# Patient Record
Sex: FEMALE | Race: WHITE | NOT HISPANIC OR LATINO | URBAN - METROPOLITAN AREA
[De-identification: names, ages, dates, MRNs, and addresses within clinical notes are randomized per-mention and may not be internally consistent; named-entity substitution may affect disease eponyms.]

---

## 2019-02-27 ENCOUNTER — TELEPHONE (OUTPATIENT)
Dept: OBSTETRICS AND GYNECOLOGY | Facility: CLINIC | Age: 39
End: 2019-02-27

## 2019-02-27 NOTE — TELEPHONE ENCOUNTER
Called in patients BC Nor-es-fe, one pack with no refills until her annual visit 03/22/2019 with Dr. Holcomb.

## 2019-02-27 NOTE — TELEPHONE ENCOUNTER
Scheduled 3/22 for annual.  Pt of Dr Denise.    [facility]--fe 24 tab   [facility]

## 2019-04-01 ENCOUNTER — OFFICE VISIT (OUTPATIENT)
Dept: OBSTETRICS AND GYNECOLOGY | Facility: CLINIC | Age: 39
End: 2019-04-01
Payer: COMMERCIAL

## 2019-04-01 VITALS
BODY MASS INDEX: 29.19 KG/M2 | WEIGHT: 171 LBS | SYSTOLIC BLOOD PRESSURE: 110 MMHG | DIASTOLIC BLOOD PRESSURE: 78 MMHG | HEIGHT: 64 IN

## 2019-04-01 DIAGNOSIS — Z01.419 WOMEN'S ANNUAL ROUTINE GYNECOLOGICAL EXAMINATION: Primary | ICD-10-CM

## 2019-04-01 PROBLEM — R51.9 HEADACHE: Status: ACTIVE | Noted: 2017-05-15

## 2019-04-01 PROBLEM — J34.3 HYPERTROPHY OF NASAL TURBINATES: Status: ACTIVE | Noted: 2017-05-15

## 2019-04-01 PROBLEM — J34.2 DEVIATED NASAL SEPTUM: Status: ACTIVE | Noted: 2017-05-15

## 2019-04-01 PROBLEM — R09.81 NASAL CONGESTION: Status: ACTIVE | Noted: 2017-05-15

## 2019-04-01 PROCEDURE — 99395 PREV VISIT EST AGE 18-39: CPT | Performed by: OBSTETRICS & GYNECOLOGY

## 2019-04-01 RX ORDER — NORETHINDRONE ACETATE AND ETHINYL ESTRADIOL AND FERROUS FUMARATE 1MG-20(24)
1 KIT ORAL DAILY
Qty: 84 TABLET | Refills: 3 | Status: SHIPPED | OUTPATIENT
Start: 2019-04-01 | End: 2020-03-30 | Stop reason: SDUPTHER

## 2019-04-01 RX ORDER — NORETHINDRONE ACETATE AND ETHINYL ESTRADIOL AND FERROUS FUMARATE 1MG-20(24)
1 KIT ORAL
COMMUNITY
Start: 2019-03-12 | End: 2019-04-01 | Stop reason: SDUPTHER

## 2019-04-01 NOTE — PROGRESS NOTES
"2019  Miley Emanuel is a 38 y.o. female who presents for annual exam.   Periods are rare, lasting 5 days. Dysmenorrhea:none. Cyclic symptoms include none. No intermenstrual bleeding, spotting, or discharge.    The patient is not currently sexually active. GYN screening history: last pap: was normal. Domestic violence: no.     Current contraception: OCP (estrogen/progesterone)  History of abnormal Pap smear: no  Last pap: 2017, normal    Regular self breast exam: yes        Family history of breast cancer: no  Family history of uterine or ovarian cancer: no    She has had a colonoscopy.    History of abnormal lipids: no  She does wear sunscreen.  She does wear her seatbelt.  Gardasil: not applicable    Menstrual History:  OB History      Para Term  AB Living    0 0 0 0 0 0    SAB TAB Ectopic Multiple Live Births    0 0 0 0 0         Patient's last menstrual period was 2019.         Review of Systems and Physical Exam  The following have been reviewed and updated as appropriate in this visit:      /78   Ht 1.626 m (5' 4\")   Wt 77.6 kg (171 lb)   LMP 2019   Breastfeeding? No   BMI 29.35 kg/m²   HPI  Review of Systems  Physical Exam   Constitutional: She is oriented to person, place, and time. She appears well-developed and well-nourished.   Genitourinary: Vagina normal and uterus normal. Pelvic exam was performed with patient in lithotomy exam position.   No labial rash or lesion.   External female genitalia normal. No signs of erythema. There is no lesion or tenderness on the right external genitalia. There is no tenderness on the left external genitalia.   Urethral meatus normal.   Urethra normal.   Normal bladder.   Vagina normal. Vagina exhibits no lesion. No tenderness or bleeding in the vagina. No vaginal discharge found.   Cervix exam normal.Cervix does not exhibit motion tenderness, discharge, friability or polyp.   Uterus is normal. Uterus is mobile. Uterus is not " enlarged or tender. Uterine contour is regular.   Adnexa normal. Right adnexum does not display mass, does not display tenderness, does not display fullness and present. Left adnexum does not display mass, does not display tenderness, does not display fullness and present.   Genitourinary Comments: Menses present   HENT:   Head: Normocephalic and atraumatic.   Neck: Neck supple. No thyromegaly present.   Cardiovascular: Normal rate and regular rhythm.    Pulmonary/Chest: Effort normal and breath sounds normal. Right breast exhibits no mass. Left breast exhibits no mass. Breasts are symmetrical. There is no breast swelling.   Abdominal: Soft. Bowel sounds are normal. She exhibits no mass. There is no tenderness. There is no rebound, no guarding and no CVA tenderness. No hernia.   Musculoskeletal: Normal range of motion.   Lymphadenopathy:     She has no cervical adenopathy.   Neurological: She is alert and oriented to person, place, and time.   Skin: Skin is warm.   Psychiatric: She has a normal mood and affect. Her behavior is normal.       Diagnoses and all orders for this visit:    Women's annual routine gynecological examination    Other orders  -     norethindrone-e.estradiol-iron 1 mg-20 mcg(24) /75 mg (4) per tablet; Take 1 tablet by mouth daily.    .  Breast self exam technique reviewed and patient encouraged to perform self-exam monthly.  Discussed healthy lifestyle modifications..  Contraception: will continue current method  General Health Maintenance:pap next year  No Follow-up on file.  Estpehania Holcomb MD

## 2020-03-30 NOTE — TELEPHONE ENCOUNTER
Medicine Refill Request    Last Office Visit: Visit date not found  Next Office Visit: Visit date not found        Current Outpatient Medications:   •  norethindrone-e.estradiol-iron 1 mg-20 mcg(24) /75 mg (4) per tablet, Take 1 tablet by mouth daily., Disp: 84 tablet, Rfl: 3      BP Readings from Last 3 Encounters:   04/01/19 110/78       Recent Lab results:  No results found for: CHOL, No results found for: HDL, No results found for: LDLCALC, No results found for: TRIG     No results found for: GLUCOSE, No results found for: HGBA1C      No results found for: CREATININE    Lab Results   Component Value Date    TSH 1.94 09/11/2015      Pt calling to refill her BC and would like it sent to [facility]. Pt is due for annual in April and will schedule for after the corona outbreak.

## 2020-03-31 RX ORDER — NORETHINDRONE ACETATE AND ETHINYL ESTRADIOL AND FERROUS FUMARATE 1MG-20(24)
1 KIT ORAL DAILY
Qty: 84 TABLET | Refills: 0 | Status: SHIPPED | OUTPATIENT
Start: 2020-03-31 | End: 2020-07-20 | Stop reason: SDUPTHER

## 2020-07-20 ENCOUNTER — OFFICE VISIT (OUTPATIENT)
Dept: OBSTETRICS AND GYNECOLOGY | Facility: CLINIC | Age: 40
End: 2020-07-20
Payer: COMMERCIAL

## 2020-07-20 VITALS — SYSTOLIC BLOOD PRESSURE: 110 MMHG | BODY MASS INDEX: 33.3 KG/M2 | DIASTOLIC BLOOD PRESSURE: 82 MMHG | WEIGHT: 194 LBS

## 2020-07-20 DIAGNOSIS — Z01.419 WOMEN'S ANNUAL ROUTINE GYNECOLOGICAL EXAMINATION: Primary | ICD-10-CM

## 2020-07-20 PROCEDURE — 99395 PREV VISIT EST AGE 18-39: CPT | Performed by: OBSTETRICS & GYNECOLOGY

## 2020-07-20 RX ORDER — FLUTICASONE PROPIONATE 50 MCG
2 SPRAY, SUSPENSION (ML) NASAL DAILY
COMMUNITY
Start: 2020-01-22

## 2020-07-20 RX ORDER — NORETHINDRONE ACETATE AND ETHINYL ESTRADIOL AND FERROUS FUMARATE 1MG-20(24)
1 KIT ORAL DAILY
Qty: 84 TABLET | Refills: 3 | Status: SHIPPED | OUTPATIENT
Start: 2020-07-20 | End: 2021-07-09

## 2020-07-20 RX ORDER — RIZATRIPTAN BENZOATE 5 MG/1
5 TABLET ORAL
COMMUNITY
Start: 2020-01-22

## 2020-07-20 NOTE — PROGRESS NOTES
2020  Miley Emanuel is a 40 y.o. female who presents for annual exam.   Periods are rare, lasting 7 days. Dysmenorrhea:none. Cyclic symptoms include none. No intermenstrual bleeding, spotting, or discharge.    The patient is not currently sexually active. GYN screening history: last pap: was normal. Domestic violence: no.     Current contraception: OCP (estrogen/progesterone)  History of abnormal Pap smear: no  Last pap: 2017, normal    Regular self breast exam: yes        Family history of breast cancer: no  Family history of uterine or ovarian cancer: no    She has had a colonoscopy.    History of abnormal lipids: yes - has been trying lifestyle changes to reduce it  She does wear sunscreen.  She does wear her seatbelt.      Menstrual History:  OB History        0    Para   0    Term   0       0    AB   0    Living   0       SAB   0    TAB   0    Ectopic   0    Multiple   0    Live Births   0                Patient's last menstrual period was 2020.  Menopause Status: Pre-Menopause  Period Pattern: (!) Irregular      Review of Systems and Physical Exam  The following have been reviewed and updated as appropriate in this visit:      Visit Vitals  /82 (BP Location: Left upper arm, Patient Position: Sitting)   Wt 88 kg (194 lb)   LMP 2020 Comment: usually get periods q 3 mos, heavy and lasted long   BMI 33.30 kg/m²     HPI  Review of Systems  Physical Exam   Constitutional: She is oriented to person, place, and time. She appears well-developed and well-nourished.   Genitourinary: Vagina normal and uterus normal. Pelvic exam was performed with patient in lithotomy exam position.   No labial rash or lesion.   External female genitalia normal. No signs of erythema. There is no lesion or tenderness on the right external genitalia. There is no tenderness on the left external genitalia.   Urethral meatus normal.   Urethra normal.   Normal bladder.   Vagina normal. Vagina exhibits no  lesion. No tenderness or bleeding in the vagina. No vaginal discharge found.   Cervix exam normal.Cervix does not exhibit motion tenderness, discharge, friability or polyp.   Uterus is normal. Uterus is mobile. Uterus is not enlarged or tender. Uterine contour is regular.   Adnexa normal. Right adnexum does not display mass, does not display tenderness, does not display fullness and present. Left adnexum does not display mass, does not display tenderness, does not display fullness and present.   HENT:   Head: Normocephalic and atraumatic.   Neck: Neck supple. No thyromegaly present.   Cardiovascular: Normal rate and regular rhythm.   Pulmonary/Chest: Effort normal and breath sounds normal. Right breast exhibits no mass and no skin change. Left breast exhibits no mass and no skin change. No breast swelling, tenderness, discharge or bleeding. Breasts are symmetrical.   Abdominal: Soft. Bowel sounds are normal. She exhibits no mass. There is no tenderness. There is no rebound, no guarding and no CVA tenderness. No hernia.   Musculoskeletal: Normal range of motion.   Lymphadenopathy:     She has no cervical adenopathy.   Neurological: She is alert and oriented to person, place, and time.   Skin: Skin is warm.   Psychiatric: She has a normal mood and affect. Her behavior is normal.       Diagnoses and all orders for this visit:    Women's annual routine gynecological examination  -     Cytology, Thinprep Pap  -     PAP AND HR HPV W/REFLEX TO GENOTYPING 16,18/45    Other orders  -     norethindrone-e.estradioL-iron 1 mg-20 mcg(24) /75 mg (4) per tablet; Take 1 tablet by mouth daily.    .  Breast self exam technique reviewed and patient encouraged to perform self-exam monthly.  Discussed healthy lifestyle modifications.  Pap smear..  Contraception: will continue current method   We had a long conversation about contraception.  She had a total elevated cholesterol of 240 last year.  She has been trying to bring that level  down with lifestyle changes.  She plans on getting another level done at the end of the summer.  I reviewed with her that if this is elevated especially if she is required to start taking statins she should be moved off of the birth control pill containing estrogen.  We discussed progesterone only methods.  I explained that estrogen would increase the risk of stroke and heart attack.  The patient promised me she would call me after she gets her next level done and would let me know what it was.  General Health Maintenance:pap obtained  No follow-ups on file.  Estephania Holcomb MD

## 2020-07-22 LAB
CLINICAL INFO: NORMAL
CYTO CVX: NORMAL
CYTOLOGIST CVX/VAG CYTO: NORMAL
CYTOLOGY CMNT CVX/VAG CYTO-IMP: NORMAL
DATE OF PREVIOUS PAP: NORMAL
DATE PREVIOUS BX: NORMAL
HPV E6+E7 MRNA CVX QL NAA+PROBE: NOT DETECTED
LMP START DATE: NORMAL
QUEST COMMENT (PAP): NORMAL
SPECIMEN SOURCE CVX/VAG CYTO: NORMAL
STAT OF ADQ CVX/VAG CYTO-IMP: NORMAL

## 2021-06-21 ENCOUNTER — TELEPHONE (OUTPATIENT)
Dept: OBSTETRICS AND GYNECOLOGY | Facility: CLINIC | Age: 41
End: 2021-06-21

## 2021-06-21 NOTE — TELEPHONE ENCOUNTER
Medicine Refill Request      Pt requesting bc refill  Last Office Visit: 4/1/2019  Last Telemedicine Visit: Visit date not found    Next Office Visit: 8/6/2021  Next Telemedicine Visit: Visit date not found         Current Outpatient Medications:   •  fluticasone propionate (FLONASE) 50 mcg/actuation nasal spray, 2 sprays daily., Disp: , Rfl:   •  norethindrone-e.estradioL-iron 1 mg-20 mcg(24) /75 mg (4) per tablet, Take 1 tablet by mouth daily., Disp: 84 tablet, Rfl: 3  •  rizatriptan (MAXALT) 5 mg tablet, Take 5 mg by mouth., Disp: , Rfl:       BP Readings from Last 3 Encounters:   07/20/20 110/82   04/01/19 110/78       Recent Lab results:  No results found for: CHOL, No results found for: HDL, No results found for: LDLCALC, No results found for: TRIG     No results found for: GLUCOSE, No results found for: HGBA1C      No results found for: CREATININE    Lab Results   Component Value Date    TSH 1.94 09/11/2015

## 2021-06-22 NOTE — TELEPHONE ENCOUNTER
Left a message on pt voicemail per Dr Holcomb request. Informed her of decline for refill of BCP d/t elevated cholesterol and wants repeat lipid panel done or can change pill.  Left number for pt to call with response.

## 2021-07-08 ENCOUNTER — TELEPHONE (OUTPATIENT)
Dept: OBSTETRICS AND GYNECOLOGY | Facility: CLINIC | Age: 41
End: 2021-07-08

## 2021-07-08 NOTE — TELEPHONE ENCOUNTER
Medicine Refill Request  Pt needs bc refill but would like a non progesterone.  Dr. Holcomb recommended . Patient has no name of script.   She is ok with the new prescriptions.  Patient accepted it through the portal.  Also has a new pharmacy.    Last Office Visit: 4/1/2019  Last Telemedicine Visit: Visit date not found    Next Office Visit: 8/10/2021  Next Telemedicine Visit: Visit date not found         Current Outpatient Medications:   •  fluticasone propionate (FLONASE) 50 mcg/actuation nasal spray, 2 sprays daily., Disp: , Rfl:   •  norethindrone-e.estradioL-iron 1 mg-20 mcg(24) /75 mg (4) per tablet, Take 1 tablet by mouth daily., Disp: 84 tablet, Rfl: 3  •  rizatriptan (MAXALT) 5 mg tablet, Take 5 mg by mouth., Disp: , Rfl:       BP Readings from Last 3 Encounters:   07/20/20 110/82   04/01/19 110/78       Recent Lab results:  No results found for: CHOL, No results found for: HDL, No results found for: LDLCALC, No results found for: TRIG     No results found for: GLUCOSE, No results found for: HGBA1C      No results found for: CREATININE    Lab Results   Component Value Date    TSH 1.94 09/11/2015

## 2021-07-08 NOTE — TELEPHONE ENCOUNTER
Did you change her pharmacy? You may have to send this directly to dr green if the pt is asking for a whole new different BC pill...

## 2021-07-09 RX ORDER — NORETHINDRONE 0.35 MG/1
1 TABLET ORAL DAILY
Qty: 84 TABLET | Refills: 1 | Status: SHIPPED | OUTPATIENT
Start: 2021-07-09 | End: 2022-01-06

## 2021-07-09 NOTE — TELEPHONE ENCOUNTER
Pt requesting non-progesterone BC pill. Per your last note 6/21/21, I do not see an updated lipid panel for this pt.

## 2021-07-09 NOTE — TELEPHONE ENCOUNTER
I called the patient to discuss the difference between a combination oral pill versus the minipill.  She has not had her cholesterol rechecked but she does not believe that it has changed much.  I reviewed how she should take the minipill the same time every day.  I reviewed a possible irregularity to periods.  She this prescription was sent into her pharmacy.

## 2022-02-03 NOTE — PROGRESS NOTES
"2022  Miley Emanuel is a 41 y.o. female who presents for annual exam.   Periods are regular every 28-30 days, lasting 4 days. Dysmenorrhea:none. Cyclic symptoms include none. No intermenstrual bleeding, spotting, or discharge.    The patient is not sexually active. GYN screening history: last pap: was normal. Domestic violence: no.     Current contraception: oral progesterone-only contraceptive.  She takes roni for period regulation, with elevated cholesterol.  History of abnormal Pap smear: no  Last pap: , normal    Regular self breast exam: yes        Family history of breast cancer: no  Family history of uterine or ovarian cancer: no    She has never had a colonoscopy.    History of abnormal lipids: yes - last checked  She does wear sunscreen.  She does wear her seatbelt.  Gardasil: completed series    Menstrual History:  OB History        0    Para   0    Term   0       0    AB   0    Living   0       SAB   0    IAB   0    Ectopic   0    Multiple   0    Live Births   0                Patient's last menstrual period was 2022.         Review of Systems and Physical Exam  The following have been reviewed and updated as appropriate in this visit:       Visit Vitals  Ht 1.575 m (5' 2\")   Wt 87.5 kg (193 lb)   LMP 2022   BMI 35.30 kg/m²     HPI  Review of Systems  Physical Exam  Constitutional:       Appearance: She is well-developed.   Genitourinary:      Pelvic exam was performed with patient in the lithotomy position.      Urethra, vagina, cervix, uterus and urethral meatus normal.      No labial rash or lesion.      There is no tenderness or lesion on the right labia.      There is no tenderness on the left labia.    No lesions in the vagina.      No vaginal discharge, tenderness or bleeding.      No cervical polyp.      Uterus is mobile.      Uterus is not enlarged or tender.      Uterus is regular.      No right or left adnexal mass present.        Right Adnexa: not tender, " not full and no mass present.     Left Adnexa: not tender, not full and no mass present.Pelvic exam was performed with patient in lithotomy exam position.     External female genitalia normal. No signs of erythema. There is no lesion or tenderness on the right external genitalia. There is no tenderness on the left external genitalia.   Urethral meatus normal.   Urethra normal.   Normal bladder.   Vagina normal.   Cervix exam normal.  Uterus is normal. Uterus is mobile. Uterus is not enlarged or tender. Uterine contour is regular. HENT:      Head: Normocephalic and atraumatic.   Neck:      Thyroid: No thyromegaly.   Cardiovascular:      Rate and Rhythm: Normal rate and regular rhythm.   Pulmonary:      Effort: Pulmonary effort is normal.      Breath sounds: Normal breath sounds.   Chest:   Breasts: Breasts are symmetrical.      Right: No swelling, bleeding, inverted nipple, mass, nipple discharge, skin change or tenderness.      Left: No swelling, bleeding, inverted nipple, mass, nipple discharge, skin change or tenderness.       Abdominal:      General: Bowel sounds are normal. There is no distension.      Palpations: Abdomen is soft. There is no mass.      Tenderness: There is no abdominal tenderness. There is no right CVA tenderness, left CVA tenderness, guarding or rebound.      Hernia: No hernia is present.   Musculoskeletal:         General: Normal range of motion.      Cervical back: Neck supple.   Lymphadenopathy:      Cervical: No cervical adenopathy.   Neurological:      Mental Status: She is alert and oriented to person, place, and time.   Skin:     General: Skin is warm.   Psychiatric:         Behavior: Behavior normal.         Diagnoses and all orders for this visit:    Women's annual routine gynecological examination  -     BI SCREENING MAMMOGRAM BILATERAL(TOMOSYNTHESIS); Future    Other orders  -     drospirenone, contraceptive, (SLYND) 4 mg (28) tablet; Take 4 mg by mouth daily.    .  Breast self exam  technique reviewed and patient encouraged to perform self-exam monthly.  Discussed healthy lifestyle modifications.  Mammogram..  Contraception: will change to Slynd.  I explained to her we should continue using a progesterone only method.  I explained that we could switch the pill to Slynd which gives a little bit of a break compared to the [place].  She would like to try it.  I prescribed that for her.  She will keep me updated.  She notes that her periods on the [place] are heavier than what she would like.  General Health Maintenance: Pap due 2023.  No follow-ups on file.  Estephania Holcomb MD

## 2022-02-04 ENCOUNTER — OFFICE VISIT (OUTPATIENT)
Dept: OBSTETRICS AND GYNECOLOGY | Facility: CLINIC | Age: 42
End: 2022-02-04
Payer: COMMERCIAL

## 2022-02-04 VITALS — WEIGHT: 193 LBS | HEIGHT: 62 IN | BODY MASS INDEX: 35.51 KG/M2

## 2022-02-04 DIAGNOSIS — Z01.419 WOMEN'S ANNUAL ROUTINE GYNECOLOGICAL EXAMINATION: Primary | ICD-10-CM

## 2022-02-04 PROBLEM — E78.5 DYSLIPIDEMIA: Status: ACTIVE | Noted: 2019-04-04

## 2022-02-04 PROCEDURE — 99395 PREV VISIT EST AGE 18-39: CPT | Performed by: OBSTETRICS & GYNECOLOGY

## 2022-02-04 PROCEDURE — 3008F BODY MASS INDEX DOCD: CPT | Performed by: OBSTETRICS & GYNECOLOGY

## 2022-02-04 RX ORDER — DROSPIRENONE 4 MG/1
4 TABLET, FILM COATED ORAL DAILY
Qty: 84 TABLET | Refills: 3 | Status: SHIPPED | OUTPATIENT
Start: 2022-02-04 | End: 2025-01-13

## 2023-05-19 ENCOUNTER — TELEPHONE (OUTPATIENT)
Dept: OBSTETRICS AND GYNECOLOGY | Facility: CLINIC | Age: 43
End: 2023-05-19
Payer: COMMERCIAL

## 2023-05-19 DIAGNOSIS — Z12.31 BREAST CANCER SCREENING BY MAMMOGRAM: Primary | ICD-10-CM

## 2024-07-19 ENCOUNTER — APPOINTMENT (OUTPATIENT)
Age: 44
Setting detail: DERMATOLOGY
End: 2024-07-19

## 2024-07-19 DIAGNOSIS — D18.0 HEMANGIOMA: ICD-10-CM

## 2024-07-19 DIAGNOSIS — Q826 OTHER SPECIFIED ANOMALIES OF SKIN: ICD-10-CM

## 2024-07-19 DIAGNOSIS — L82.1 OTHER SEBORRHEIC KERATOSIS: ICD-10-CM

## 2024-07-19 DIAGNOSIS — D22 MELANOCYTIC NEVI: ICD-10-CM

## 2024-07-19 DIAGNOSIS — L57.8 OTHER SKIN CHANGES DUE TO CHRONIC EXPOSURE TO NONIONIZING RADIATION: ICD-10-CM

## 2024-07-19 DIAGNOSIS — Q828 OTHER SPECIFIED ANOMALIES OF SKIN: ICD-10-CM

## 2024-07-19 DIAGNOSIS — L81.4 OTHER MELANIN HYPERPIGMENTATION: ICD-10-CM

## 2024-07-19 DIAGNOSIS — Q819 OTHER SPECIFIED ANOMALIES OF SKIN: ICD-10-CM

## 2024-07-19 DIAGNOSIS — L81.5 LEUKODERMA, NOT ELSEWHERE CLASSIFIED: ICD-10-CM

## 2024-07-19 PROBLEM — L85.8 OTHER SPECIFIED EPIDERMAL THICKENING: Status: ACTIVE | Noted: 2024-07-19

## 2024-07-19 PROBLEM — D22.61 MELANOCYTIC NEVI OF RIGHT UPPER LIMB, INCLUDING SHOULDER: Status: ACTIVE | Noted: 2024-07-19

## 2024-07-19 PROBLEM — D18.01 HEMANGIOMA OF SKIN AND SUBCUTANEOUS TISSUE: Status: ACTIVE | Noted: 2024-07-19

## 2024-07-19 PROCEDURE — OTHER COUNSELING: OTHER

## 2024-07-19 PROCEDURE — 99213 OFFICE O/P EST LOW 20 MIN: CPT

## 2024-07-19 PROCEDURE — OTHER SUNSCREEN RECOMMENDATIONS: OTHER

## 2024-07-19 ASSESSMENT — LOCATION SIMPLE DESCRIPTION DERM
LOCATION SIMPLE: RIGHT UPPER ARM
LOCATION SIMPLE: LEFT THIGH
LOCATION SIMPLE: RIGHT POSTERIOR UPPER ARM
LOCATION SIMPLE: ABDOMEN
LOCATION SIMPLE: LEFT POSTERIOR UPPER ARM
LOCATION SIMPLE: UPPER BACK
LOCATION SIMPLE: RIGHT THIGH
LOCATION SIMPLE: LEFT FOREHEAD
LOCATION SIMPLE: RIGHT UPPER BACK
LOCATION SIMPLE: LEFT LOWER BACK

## 2024-07-19 ASSESSMENT — LOCATION DETAILED DESCRIPTION DERM
LOCATION DETAILED: LEFT INFERIOR MEDIAL MIDBACK
LOCATION DETAILED: LEFT PROXIMAL POSTERIOR UPPER ARM
LOCATION DETAILED: SUPERIOR THORACIC SPINE
LOCATION DETAILED: RIGHT ANTERIOR PROXIMAL THIGH
LOCATION DETAILED: RIGHT SUPERIOR UPPER BACK
LOCATION DETAILED: RIGHT PROXIMAL POSTERIOR UPPER ARM
LOCATION DETAILED: EPIGASTRIC SKIN
LOCATION DETAILED: LEFT INFERIOR FOREHEAD
LOCATION DETAILED: LEFT ANTERIOR PROXIMAL THIGH

## 2024-07-19 ASSESSMENT — LOCATION ZONE DERM
LOCATION ZONE: ARM
LOCATION ZONE: TRUNK
LOCATION ZONE: FACE
LOCATION ZONE: LEG

## 2024-07-19 ASSESSMENT — SEVERITY ASSESSMENT: SEVERITY: MILD

## 2025-01-13 ENCOUNTER — OFFICE VISIT (OUTPATIENT)
Dept: OBSTETRICS AND GYNECOLOGY | Facility: CLINIC | Age: 45
End: 2025-01-13
Payer: COMMERCIAL

## 2025-01-13 VITALS
BODY MASS INDEX: 36.11 KG/M2 | DIASTOLIC BLOOD PRESSURE: 80 MMHG | SYSTOLIC BLOOD PRESSURE: 120 MMHG | HEIGHT: 63 IN | WEIGHT: 203.8 LBS

## 2025-01-13 DIAGNOSIS — Z13.220 SCREENING FOR LIPID DISORDERS: ICD-10-CM

## 2025-01-13 DIAGNOSIS — Z13.1 SCREENING FOR DIABETES MELLITUS (DM): ICD-10-CM

## 2025-01-13 DIAGNOSIS — Z01.419 WOMEN'S ANNUAL ROUTINE GYNECOLOGICAL EXAMINATION: Primary | ICD-10-CM

## 2025-01-13 DIAGNOSIS — Z13.0 SCREENING FOR DEFICIENCY ANEMIA: ICD-10-CM

## 2025-01-13 PROCEDURE — 3008F BODY MASS INDEX DOCD: CPT | Performed by: OBSTETRICS & GYNECOLOGY

## 2025-01-13 PROCEDURE — 99396 PREV VISIT EST AGE 40-64: CPT | Performed by: OBSTETRICS & GYNECOLOGY

## 2025-01-13 NOTE — PROGRESS NOTES
"2025  Miley Emanuel is a 44 y.o. female who presents for annual exam.   Periods are regular every 28-30 days, lasting several days. Dysmenorrhea:moderate, occurring throughout menses. Cyclic symptoms include none. No intermenstrual bleeding, spotting, or discharge.    The patient is not currently sexually active. Domestic violence: no.     Current contraception: abstinence  History of abnormal Pap smear: no  Last pap: , normal    Regular self breast exam: yes    Family history of breast cancer: no  Family history of uterine or ovarian cancer: no        She does wear sunscreen.  She does wear her seatbelt.  Gardasil: completed series    Menstrual History:  OB History          0    Para   0    Term   0       0    AB   0    Living   0         SAB   0    IAB   0    Ectopic   0    Multiple   0    Live Births   0                Patient's last menstrual period was 2024.         Review of Systems and Physical Exam  The following have been reviewed and updated as appropriate in this visit:       Visit Vitals  /80 (BP Location: Left upper arm, Patient Position: Sitting)   Ht 1.6 m (5' 3\")   Wt 92.4 kg (203 lb 12.8 oz)   LMP 2024   BMI 36.10 kg/m²     Physical Exam  Constitutional:       Appearance: Normal appearance. She is well-developed.   HENT:      Head: Normocephalic and atraumatic.     Genitourinary:    Urethra, bladder, vagina, cervix, uterus, urethral meatus and external female genitalia normal.   Pelvic exam was performed with patient in lithotomy exam position.   Pelvic exam conducted with staff chaperone present. There is no lesion or tenderness on the right external genitalia. There is no lesion or tenderness on the left external genitalia. No bladder tenderness or bladder mass.    No vaginal discharge or bleeding.      No lesions in the vagina.   Uterus is mobile. Uterus is not enlarged or tender. Uterine contour is regular.      Right Adnexa: not tender, not full and no " mass present.     Left Adnexa: no tenderness, not full and no mass present.  Neck:      Thyroid: No thyromegaly.   Cardiovascular:      Rate and Rhythm: Normal rate and regular rhythm.   Pulmonary:      Effort: Pulmonary effort is normal.      Breath sounds: Normal breath sounds.   Chest:   Breasts:     Right: No swelling, bleeding, inverted nipple, mass, nipple discharge, skin change or tenderness.      Left: No swelling, bleeding, inverted nipple, mass, nipple discharge, skin change or tenderness.   Abdominal:      General: Bowel sounds are normal. There is no distension.      Palpations: There is no mass.      Tenderness: There is no abdominal tenderness. There is no right CVA tenderness, left CVA tenderness, guarding or rebound.      Hernia: No hernia is present.   Musculoskeletal:      Cervical back: Neck supple.   Lymphadenopathy:      Cervical: No cervical adenopathy.   Neurological:      Mental Status: She is alert and oriented to person, place, and time.   Skin:     General: Skin is warm and dry.   Psychiatric:         Behavior: Behavior normal.   Vitals reviewed. Exam conducted with a chaperone present.         Diagnoses and all orders for this visit:    Women's annual routine gynecological examination  -     Cytology, Thinprep Pap  -     BI SCREENING MAMMOGRAM BILATERAL(TOMOSYNTHESIS); Future    Screening for lipid disorders  -     Lipid panel; Future    Screening for deficiency anemia  -     CBC and Differential; Future    Screening for diabetes mellitus (DM)  -     Comprehensive metabolic panel; Future  -     Hemoglobin A1c    .  Breast self exam technique reviewed and patient encouraged to perform self-exam monthly.  Discussed healthy lifestyle modifications.  Mammogram.  Pap smear..  Contraception: will continue current method  General Health Maintenance:pap obtained  No follow-ups on file.  Estephania Holcomb MD

## 2025-07-25 ENCOUNTER — APPOINTMENT (OUTPATIENT)
Age: 45
Setting detail: DERMATOLOGY
End: 2025-07-25

## 2025-07-25 DIAGNOSIS — D22 MELANOCYTIC NEVI: ICD-10-CM

## 2025-07-25 DIAGNOSIS — L82.1 OTHER SEBORRHEIC KERATOSIS: ICD-10-CM

## 2025-07-25 DIAGNOSIS — L70.0 ACNE VULGARIS: ICD-10-CM

## 2025-07-25 DIAGNOSIS — L81.4 OTHER MELANIN HYPERPIGMENTATION: ICD-10-CM

## 2025-07-25 DIAGNOSIS — D18.0 HEMANGIOMA: ICD-10-CM

## 2025-07-25 DIAGNOSIS — L57.8 OTHER SKIN CHANGES DUE TO CHRONIC EXPOSURE TO NONIONIZING RADIATION: ICD-10-CM

## 2025-07-25 PROBLEM — D22.5 MELANOCYTIC NEVI OF TRUNK: Status: ACTIVE | Noted: 2025-07-25

## 2025-07-25 PROBLEM — D18.01 HEMANGIOMA OF SKIN AND SUBCUTANEOUS TISSUE: Status: ACTIVE | Noted: 2025-07-25

## 2025-07-25 PROCEDURE — OTHER PRESCRIPTION MEDICATION MANAGEMENT: OTHER

## 2025-07-25 PROCEDURE — OTHER COUNSELING: OTHER

## 2025-07-25 PROCEDURE — OTHER SUNSCREEN RECOMMENDATIONS: OTHER

## 2025-07-25 PROCEDURE — 99213 OFFICE O/P EST LOW 20 MIN: CPT

## 2025-07-25 ASSESSMENT — LOCATION ZONE DERM
LOCATION ZONE: TRUNK
LOCATION ZONE: FACE

## 2025-07-25 ASSESSMENT — LOCATION DETAILED DESCRIPTION DERM
LOCATION DETAILED: MIDDLE STERNUM
LOCATION DETAILED: RIGHT INFERIOR UPPER BACK
LOCATION DETAILED: LEFT INFERIOR CENTRAL MALAR CHEEK
LOCATION DETAILED: XIPHOID
LOCATION DETAILED: RIGHT MID-UPPER BACK
LOCATION DETAILED: LOWER STERNUM

## 2025-07-25 ASSESSMENT — LOCATION SIMPLE DESCRIPTION DERM
LOCATION SIMPLE: RIGHT UPPER BACK
LOCATION SIMPLE: CHEST
LOCATION SIMPLE: LEFT CHEEK
LOCATION SIMPLE: ABDOMEN

## 2025-08-15 ENCOUNTER — HOSPITAL ENCOUNTER (OUTPATIENT)
Dept: RADIOLOGY | Age: 45
Discharge: HOME | End: 2025-08-15
Attending: OBSTETRICS & GYNECOLOGY
Payer: COMMERCIAL

## 2025-08-15 ENCOUNTER — APPOINTMENT (OUTPATIENT)
Dept: RADIOLOGY | Age: 45
End: 2025-08-15
Payer: COMMERCIAL

## 2025-08-15 DIAGNOSIS — Z01.419 WOMEN'S ANNUAL ROUTINE GYNECOLOGICAL EXAMINATION: ICD-10-CM

## 2025-08-15 PROCEDURE — 77063 BREAST TOMOSYNTHESIS BI: CPT
